# Patient Record
Sex: FEMALE | Race: BLACK OR AFRICAN AMERICAN | NOT HISPANIC OR LATINO | URBAN - METROPOLITAN AREA
[De-identification: names, ages, dates, MRNs, and addresses within clinical notes are randomized per-mention and may not be internally consistent; named-entity substitution may affect disease eponyms.]

---

## 2017-08-16 ENCOUNTER — EMERGENCY (EMERGENCY)
Facility: HOSPITAL | Age: 42
LOS: 1 days | Discharge: HOME | End: 2017-08-16
Admitting: EMERGENCY MEDICINE
Payer: COMMERCIAL

## 2017-08-16 VITALS
SYSTOLIC BLOOD PRESSURE: 118 MMHG | RESPIRATION RATE: 18 BRPM | OXYGEN SATURATION: 93 % | TEMPERATURE: 98 F | HEART RATE: 93 BPM | DIASTOLIC BLOOD PRESSURE: 80 MMHG

## 2017-08-16 PROCEDURE — 99285 EMERGENCY DEPT VISIT HI MDM: CPT

## 2017-08-16 RX ORDER — LIDOCAINE 4 G/100G
1 CREAM TOPICAL ONCE
Qty: 0 | Refills: 0 | Status: COMPLETED | OUTPATIENT
Start: 2017-08-16 | End: 2017-08-16

## 2017-08-16 RX ORDER — IBUPROFEN 200 MG
600 TABLET ORAL ONCE
Qty: 0 | Refills: 0 | Status: COMPLETED | OUTPATIENT
Start: 2017-08-16 | End: 2017-08-16

## 2017-08-16 RX ADMIN — Medication 600 MILLIGRAM(S): at 22:09

## 2017-08-16 RX ADMIN — LIDOCAINE 1 PATCH: 4 CREAM TOPICAL at 22:09
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
## 2017-08-16 NOTE — ED PROVIDER NOTE - PLAN OF CARE
Avoid strenuous exercise. Take Ibuprofen 600mg every 6 hours as needed for mild-moderate pain. Follow up with your primary care physician within 1 week for further evaluation. Return to the Emergency Department for any new, worsening or concerning symptoms. Avoid strenuous exercise. Take Ibuprofen 600mg every 6 hours as needed for mild-moderate pain. Apply heat packs/pads on affected area. Follow up with your primary care physician within 1 week for further evaluation. Return to the Emergency Department for any new, worsening or concerning symptoms.

## 2017-08-16 NOTE — ED PROVIDER NOTE - PROGRESS NOTE DETAILS
KOTA Crystal: pt NAD, VSS, pain improved with medication. Pt ambulating without assistance. Advised to follow up with her PCP.
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
## 2017-08-16 NOTE — ED PROVIDER NOTE - CARE PLAN
Instructions for follow-up, activity and diet:	Avoid strenuous exercise. Take Ibuprofen 600mg every 6 hours as needed for mild-moderate pain. Follow up with your primary care physician within 1 week for further evaluation. Return to the Emergency Department for any new, worsening or concerning symptoms. Principal Discharge DX:	Back pain  Instructions for follow-up, activity and diet:	Avoid strenuous exercise. Take Ibuprofen 600mg every 6 hours as needed for mild-moderate pain. Apply heat packs/pads on affected area. Follow up with your primary care physician within 1 week for further evaluation. Return to the Emergency Department for any new, worsening or concerning symptoms.

## 2017-08-16 NOTE — ED PROVIDER NOTE - MUSCULOSKELETAL MINIMAL EXAM
TENDERNESS/no visible deformities.ecchymosis, no midline tenderness, mild tenderness to palpation to the paraspinal muscles/motor intact/atraumatic
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
## 2023-03-02 NOTE — ED PROVIDER NOTE - CHIEF COMPLAINT
The patient is a 42y Female complaining of [facility]  General Surgery  [address] [place] [address] - [address] [place] [address]  Phone: [phone]  Fax: